# Patient Record
Sex: MALE | Race: BLACK OR AFRICAN AMERICAN | NOT HISPANIC OR LATINO | Employment: OTHER | URBAN - METROPOLITAN AREA
[De-identification: names, ages, dates, MRNs, and addresses within clinical notes are randomized per-mention and may not be internally consistent; named-entity substitution may affect disease eponyms.]

---

## 2017-05-03 ENCOUNTER — DOCUMENTATION ONLY (OUTPATIENT)
Dept: NEUROLOGY | Facility: HOSPITAL | Age: 77
End: 2017-05-03

## 2017-05-03 NOTE — PROGRESS NOTES
Video capsule study reviewed independently. There are a variety of minor abnormalities in the small bowel of little or no clinical significance. There is a polyp in the cecum vs proximal ascending colon which is 10mm in size. There is also a small amount of bright red blood in the ascending colon.   Recommend:   Repeat colonoscopy is the next step in this case to remove the observed polyp and re-evaluate for angioectasias or other bleeding sources in the right colon

## 2017-05-10 ENCOUNTER — TELEPHONE (OUTPATIENT)
Dept: NEUROLOGY | Facility: HOSPITAL | Age: 77
End: 2017-05-10

## 2017-05-10 NOTE — TELEPHONE ENCOUNTER
Patient refused to schedule at this tome because  he was told he would have an upper and lower endoscopy to remove polys in the colon and the stomach. Will send Dr Osullivan a message to clarify.

## 2017-05-10 NOTE — TELEPHONE ENCOUNTER
----- Message from Tammie Root sent at 5/9/2017 11:32 AM CDT -----  GI- Nurse Prater called and stated why this patient hasn't been scheduled yet. Please call Nurse Prater at [phone] [phone].

## 2017-05-10 NOTE — TELEPHONE ENCOUNTER
----- Message from Brayden Osullivan MD sent at 5/10/2017 12:15 PM CDT -----  Yes. Nulytely     ----- Message -----     From: Sofie Mckinley LPN     Sent: 5/10/2017  10:42 AM       To: Brayden Osullivan MD    Can I direct schedule this patient for a c-scope?  Thanks   ----- Message -----     From: Tammie Root     Sent: 5/9/2017   2:43 PM       To: Sofie Mckinley LPN    Patient needs to be scheduled for a Colonoscopy. Please call patient at [phone].

## 2017-05-10 NOTE — TELEPHONE ENCOUNTER
----- Message from Tammie Root sent at 5/9/2017  2:43 PM CDT -----  Patient needs to be scheduled for a Colonoscopy. Please call patient at [phone].

## 2017-05-11 ENCOUNTER — TELEPHONE (OUTPATIENT)
Dept: NEUROLOGY | Facility: HOSPITAL | Age: 77
End: 2017-05-11

## 2017-05-11 NOTE — TELEPHONE ENCOUNTER
----- Message from Candy Sarah sent at 5/11/2017  3:25 PM CDT -----  Contact: Patient  GI- Patient would like to speak to Sofie in regards to making an appointment with Dr. Osullivan. Patient can be reached at [phone].

## 2017-05-11 NOTE — TELEPHONE ENCOUNTER
Pt informed we need his records of his last EGD to decide. We don't usually remove polyps from the stomach unless they are bleeding per Dr Morse. Pt states to [facility] to get [facility]. Called place to Suly no answer no vm available. Will follow up tomorrow

## 2017-05-16 ENCOUNTER — TELEPHONE (OUTPATIENT)
Dept: NEUROLOGY | Facility: HOSPITAL | Age: 77
End: 2017-05-16

## 2017-05-16 DIAGNOSIS — K92.2 LOWER GI BLEED: Primary | ICD-10-CM

## 2017-05-16 NOTE — TELEPHONE ENCOUNTER
----- Message from Janel Avi sent at 5/16/2017 12:55 PM CDT -----  GI- Patient is returning Sofie's call. Please call back to assist at [phone].

## 2017-05-17 ENCOUNTER — DOCUMENTATION ONLY (OUTPATIENT)
Dept: NEUROLOGY | Facility: HOSPITAL | Age: 77
End: 2017-05-17

## 2017-05-17 NOTE — PROGRESS NOTES
[facility] reviewed. The most recent colonoscopy documented was from Jan 2017 which documents removal of two polyps in the ascending colon but one polyp was not removed due to active anti-platelet therapy. EGD that same day was revealing for a hiatal hernia only. Recent video capsule endoscopy demonstrates a significant polyp in the right colon associated with a small amount of bright red blood.     My recommendation is the same:     Repeat colonoscopy is the next step in this case to remove remaining polyps from the ascending and transverse colon as well as to re-evaluate for angioectasias or other bleeding sources in the right colon

## 2017-05-18 NOTE — TELEPHONE ENCOUNTER
Nulytely Colonoscopy Prep Instructions    [facility] - [address], [place] [address]  [phone]      PATIENT NAME:    Behzad Ruiz                 PROCEDURE DAY: Thursday June 8, 2018     *Your procedure time many change.  The hospital will contact you the day prior to   the procedure to confirm your procedure time and arrival.       CLEAR LIQUID DIET (START THE DAY BEFORE PROCEDURE): Wednesday       Clear Liquid Diet means any liquid from the list below that is not red or purple in color:   Gatorade, Dov-Aid, Lemonade (Yellow ONLY)-Gatorade is the preferred liquid   Tea (no milk or dairy)   Carbonated beverages (soft drink), regular or diet   Apple juice, white grape juice, white cranberry juice   Jell-O (orange, lemon, or lime flavors ONLY)   Clear, fat-free, beef or chicken broth   Bouillon, clear consommé   Snowball, Popsicles (NOT red or purple)  * No Solid Food or Alcohol     ITEMS TO BE PURCHASED FOR PREP (Nu-Lytely requires a prescription):         Nu-Lytely preparation solution.          Gas tablets (Gas-X, Mylanta Gas, Simethicone)    BOWEL PREP INSTRUCTIONS THE DAY BEFORE THE EXAM: Wednesday   1. Drink only clear liquids (see the above diet) all day. Gatorade is the best liquid.   Drink an extra 8 ounces of clear liquid every hour from 11am to 5pm.         2.   At 6pm, mix Nu-Lytely powder according to the directions on the container and               drink 8 ounces of solution every 10 minutes until about half of the solution is                consumed. Place the remainder of the solution in the refrigerator.         3.   At 9pm, take two gas tablets with 8 ounces of clear liquid.        4.   At 10pm, take two gas tablets with 8 ounces of clear liquid.      THE DAY OF THE EXAM: Thursday         1.  Beginning 5 hours before your procedure time, drink the remaining half of              Nu-Lytely solution. Drink 8 ounces of solution every 10 minutes until the  solution              is gone.              *If your procedure is scheduled for the early morning, you will need to get up in               the middle of the night to take this dose of preparation. The correct timing of this               dose is essential to an effective preparation. If you do not take this dose, your               exam may be incomplete and need to be repeated.         2.  Have nothing to eat or drink for 3 hours before the procedure.         3.  Take your morning medications, if any, with a small sip of water.         4.  Bring someone to drive you home (you should not drive for 12 hrs after the exam)        5.  Report to Admitting, 1st floor hospital entrance 2 hours before procedure time.       If you are diabetic, do not take insulin or oral medications the morning of the procedure. Take only a half dose of insulin the day before your procedure. Do not take your diabetic pills the day of your procedure               Colonoscopy is used to view the inside of your lower digestive tract (colon and rectum). It can help screen for colon cancer and can also help find the source of abdominal pain, bleeding, and changes in bowel habits. The test is usually done in the hospital on an outpatient basis. During the exam, the doctor can remove a small tissue sample ( a biopsy) for testing. Small growths, such as polyps, may also be removed during colonoscopy.     A camera attached to a flexible tube with a viewing lens is used to take video pictures.    Getting Ready    Be sure to tell your doctor about any medications you take. Also tell your doctor about any health conditions you may have.   Discuss the risks of the test with your doctor. These include bleeding and bowel puncture.   Your rectum and colon must be empty for the test. So be sure to follow the diet and bowel prep instructions exactly. If you dont, the test may need to be rescheduled.   You will need to have a friend or family member  prepared to drive you home after the test.     Colonoscopy provides an inside view of the entire colon.    During the Test    You are given sedating (relaxing) medication through an IV line. You may be drowsy or completely asleep.   The procedure takes 30 minutes or longer.   The doctor performs a digital rectal exam to check for anal and rectal problems. The rectum is lubricated and the scope inserted.   If you are awake, you may have a feeling similar to needing to have a bowel movement. You may also feel pressure as air is pumped into the colon. Its okay to pass gas during the procedure.  After the Test    You may discuss the results with your doctor right away or at a future visit.   Try to pass all the gas right after the test to help prevent bloating and cramping.   After the test, you can go back to your normal eating and other activities.  Risks and Possible Complications Include:   Bleeding  A puncture or tear in the colon  Risks of anesthesia

## 2017-05-18 NOTE — TELEPHONE ENCOUNTER
LVM to get correct PO BOX information. Instructions mailed to new PO BOX but did not add new PO BOX in demographics.

## 2017-05-18 NOTE — TELEPHONE ENCOUNTER
Pt scheduled for C-scope 6/8. Instructions given and mailed. Pt states he has Nulytely prep already.

## 2017-05-18 NOTE — TELEPHONE ENCOUNTER
Pt states he has VCE report that states he has a polyp in the stomach and requesting to speak with Dr Osullivan about the findings.

## 2017-05-22 ENCOUNTER — TELEPHONE (OUTPATIENT)
Dept: NEUROLOGY | Facility: HOSPITAL | Age: 77
End: 2017-05-22

## 2017-05-22 NOTE — TELEPHONE ENCOUNTER
Patient requesting to have c-scope done as inpatient due to not having any family or friends to drive him home after. Message sent to Dr Osullivan.

## 2017-05-22 NOTE — TELEPHONE ENCOUNTER
----- Message from Candy Sarah sent at 5/22/2017 10:22 AM CDT -----  Contact: Patient  GI- Patient could like to speak to the nurse in regards to instructions for his colonoscopy. Patient can be reached at [phone].

## 2017-05-23 NOTE — TELEPHONE ENCOUNTER
Patient notified colonoscopy can not be scheduled as inpatient per Dr Osullivan and will need to have someone there at time of discharge.

## 2017-05-24 ENCOUNTER — TELEPHONE (OUTPATIENT)
Dept: NEUROLOGY | Facility: HOSPITAL | Age: 77
End: 2017-05-24

## 2017-05-24 NOTE — TELEPHONE ENCOUNTER
Management plan discussed with patient. He does not need another EGD at this time (last exam Jan 2017) but does need a repeat colonoscopy to remove the remaining polyp in the colon not removed in January 2017 and to evaluate for bleeding sources in the right colon as he has small amounts of blood in his right colon on capsule endoscopy.

## 2017-06-05 ENCOUNTER — TELEPHONE (OUTPATIENT)
Dept: NEUROLOGY | Facility: HOSPITAL | Age: 77
End: 2017-06-05

## 2017-06-05 NOTE — TELEPHONE ENCOUNTER
----- Message from Ana Feliz sent at 6/5/2017  9:03 AM CDT -----  Contact: Patient  GI- Patient has questions about his medications  when to get off his plavix, isosorarode nitrate, and Patient can be reached at [phone].

## 2017-06-06 ENCOUNTER — TELEPHONE (OUTPATIENT)
Dept: NEUROLOGY | Facility: HOSPITAL | Age: 77
End: 2017-06-06

## 2017-06-06 NOTE — TELEPHONE ENCOUNTER
----- Message from Ana Feliz sent at 6/6/2017  9:55 AM CDT -----  Contact: Patient  GI- Patient has the movi prep for his procedure , he is wondering if he can use that instead of go lightly. Patients contact number is [phone]

## 2017-06-07 ENCOUNTER — TELEPHONE (OUTPATIENT)
Dept: NEUROLOGY | Facility: HOSPITAL | Age: 77
End: 2017-06-07

## 2017-06-07 NOTE — TELEPHONE ENCOUNTER
Pt given movie prep instructions again, advised not to eat any solid foods until after the procedure. Will call endoscopy to get time arrival for pt.

## 2017-06-07 NOTE — TELEPHONE ENCOUNTER
----- Message from Ana Feliz sent at 6/7/2017  8:59 AM CDT -----  Contact: Patient   GI- Patient needs directions on eating, Patient is very confused as to when he starts his prep. Patients call back number [phone]

## 2017-06-08 ENCOUNTER — HOSPITAL ENCOUNTER (OUTPATIENT)
Facility: HOSPITAL | Age: 77
Discharge: HOME | End: 2017-06-08
Attending: INTERNAL MEDICINE | Admitting: INTERNAL MEDICINE
Payer: COMMERCIAL

## 2017-06-08 ENCOUNTER — ANESTHESIA (OUTPATIENT)
Dept: ENDOSCOPY | Facility: HOSPITAL | Age: 77
End: 2017-06-08
Payer: COMMERCIAL

## 2017-06-08 ENCOUNTER — ANESTHESIA EVENT (OUTPATIENT)
Dept: ENDOSCOPY | Facility: HOSPITAL | Age: 77
End: 2017-06-08
Payer: COMMERCIAL

## 2017-06-08 VITALS
WEIGHT: 195 LBS | RESPIRATION RATE: 14 BRPM | OXYGEN SATURATION: 97 % | DIASTOLIC BLOOD PRESSURE: 78 MMHG | SYSTOLIC BLOOD PRESSURE: 134 MMHG | HEIGHT: 71 IN | TEMPERATURE: 97 F | BODY MASS INDEX: 27.3 KG/M2 | HEART RATE: 54 BPM

## 2017-06-08 DIAGNOSIS — K63.5 POLYP OF COLON, UNSPECIFIED PART OF COLON, UNSPECIFIED TYPE: Primary | ICD-10-CM

## 2017-06-08 DIAGNOSIS — K63.5 COLON POLYPS: ICD-10-CM

## 2017-06-08 DIAGNOSIS — K63.5 POLYP OF TRANSVERSE COLON, UNSPECIFIED TYPE: ICD-10-CM

## 2017-06-08 LAB — POCT GLUCOSE: 133 MG/DL (ref 70–110)

## 2017-06-08 PROCEDURE — 45390 COLONOSCOPY W/RESECTION: CPT | Performed by: INTERNAL MEDICINE

## 2017-06-08 PROCEDURE — 93005 ELECTROCARDIOGRAM TRACING: CPT

## 2017-06-08 PROCEDURE — 25000003 PHARM REV CODE 250: Performed by: INTERNAL MEDICINE

## 2017-06-08 PROCEDURE — 45381 COLONOSCOPY SUBMUCOUS NJX: CPT | Performed by: INTERNAL MEDICINE

## 2017-06-08 PROCEDURE — 27200997: Performed by: INTERNAL MEDICINE

## 2017-06-08 PROCEDURE — 63600175 PHARM REV CODE 636 W HCPCS: Performed by: NURSE ANESTHETIST, CERTIFIED REGISTERED

## 2017-06-08 PROCEDURE — 27201089 HC SNARE, DISP (ANY): Performed by: INTERNAL MEDICINE

## 2017-06-08 PROCEDURE — 45385 COLONOSCOPY W/LESION REMOVAL: CPT | Performed by: INTERNAL MEDICINE

## 2017-06-08 PROCEDURE — 88305 TISSUE EXAM BY PATHOLOGIST: CPT | Performed by: PATHOLOGY

## 2017-06-08 PROCEDURE — 37000009 HC ANESTHESIA EA ADD 15 MINS: Performed by: INTERNAL MEDICINE

## 2017-06-08 PROCEDURE — 27201028 HC NEEDLE, SCLERO: Performed by: INTERNAL MEDICINE

## 2017-06-08 PROCEDURE — 88305 TISSUE EXAM BY PATHOLOGIST: CPT | Mod: 26,,, | Performed by: PATHOLOGY

## 2017-06-08 PROCEDURE — 37000008 HC ANESTHESIA 1ST 15 MINUTES: Performed by: INTERNAL MEDICINE

## 2017-06-08 PROCEDURE — 93010 ELECTROCARDIOGRAM REPORT: CPT | Mod: ,,, | Performed by: INTERNAL MEDICINE

## 2017-06-08 PROCEDURE — 25000003 PHARM REV CODE 250: Performed by: NURSE ANESTHETIST, CERTIFIED REGISTERED

## 2017-06-08 RX ORDER — CLOPIDOGREL BISULFATE 75 MG/1
75 TABLET ORAL DAILY
Start: 2017-06-08

## 2017-06-08 RX ORDER — CLOPIDOGREL BISULFATE 75 MG/1
75 TABLET ORAL DAILY
Status: ON HOLD | COMMUNITY
End: 2017-06-08

## 2017-06-08 RX ORDER — GLIPIZIDE 5 MG/1
5 TABLET, FILM COATED, EXTENDED RELEASE ORAL
COMMUNITY

## 2017-06-08 RX ORDER — ASPIRIN 81 MG/1
81 TABLET ORAL DAILY
COMMUNITY

## 2017-06-08 RX ORDER — LIDOCAINE HCL/PF 100 MG/5ML
SYRINGE (ML) INTRAVENOUS
Status: DISCONTINUED | OUTPATIENT
Start: 2017-06-08 | End: 2017-06-08

## 2017-06-08 RX ORDER — SPIRONOLACTONE 25 MG/1
25 TABLET ORAL DAILY
COMMUNITY

## 2017-06-08 RX ORDER — FUROSEMIDE 20 MG/1
20 TABLET ORAL DAILY
COMMUNITY

## 2017-06-08 RX ORDER — PROPOFOL 10 MG/ML
VIAL (ML) INTRAVENOUS CONTINUOUS PRN
Status: DISCONTINUED | OUTPATIENT
Start: 2017-06-08 | End: 2017-06-08

## 2017-06-08 RX ORDER — ISOSORBIDE MONONITRATE 120 MG/1
120 TABLET, EXTENDED RELEASE ORAL DAILY
COMMUNITY

## 2017-06-08 RX ORDER — PROPOFOL 10 MG/ML
VIAL (ML) INTRAVENOUS
Status: DISCONTINUED | OUTPATIENT
Start: 2017-06-08 | End: 2017-06-08

## 2017-06-08 RX ORDER — FINASTERIDE 5 MG/1
10 TABLET, FILM COATED ORAL DAILY
COMMUNITY

## 2017-06-08 RX ORDER — SODIUM CHLORIDE 9 MG/ML
INJECTION, SOLUTION INTRAVENOUS CONTINUOUS
Status: DISCONTINUED | OUTPATIENT
Start: 2017-06-08 | End: 2017-06-08 | Stop reason: HOSPADM

## 2017-06-08 RX ADMIN — LIDOCAINE HYDROCHLORIDE 20 MG: 20 INJECTION, SOLUTION INTRAVENOUS at 01:06

## 2017-06-08 RX ADMIN — PROPOFOL 50 MG: 10 INJECTION, EMULSION INTRAVENOUS at 01:06

## 2017-06-08 RX ADMIN — SODIUM CHLORIDE: 0.9 INJECTION, SOLUTION INTRAVENOUS at 12:06

## 2017-06-08 RX ADMIN — PROPOFOL 100 MCG/KG/MIN: 10 INJECTION, EMULSION INTRAVENOUS at 01:06

## 2017-06-08 NOTE — DISCHARGE INSTRUCTIONS
Discharge Summary/Instructions for after Colonoscopy with Biopsy/Polypectomy    Behzad Ruiz  6/8/2017  Brayden Osullivan MD    Restrictions on Activity:    - Do not drive car or operate machinery until the day after the procedure.  - The following day: return to full activity including work.  - For 3 days: No heavy lifting, straining or running.  - Diet: Eat and drink normally unless instructed otherwise.    Treatment for Common Side Effects:  - Mild abdominal pain and bloating or excessive gas: rest, eat lightly and use a heating pad.     Symptoms to watch for and report to your physician:  1. Severe abdominal pain.  2. Fever within 24 hours after a procedure.  3. A large amount of rectal bleeding. (A small amount of blood from the rectum is not serious, especially if hemorrhoids are present.)  4. Because air was put into your colon during the procedure, expelling large amount of air from your rectum is normal.  5. You may not have a bowel movement for 1-3 days because of the colonoscopy prep. This is normal.  6. Go directly to the emergency room if you notice any of the following:     Chills and/or fever over 101   Persistent vomiting   Severe abdominal pain, other than gas cramps   Severe chest pain   Black, tarry stools   Any bleeding - exceeding one tablespoon    If you have any questions or problems, please call your Physician:    Brayden Osullivan MD      Lab Results: Contact Physician's Office      If a complication or emergency situation arises and you are unable to reach your Physician - GO TO THE EMERGENCY ROOM.

## 2017-06-08 NOTE — TRANSFER OF CARE
"Anesthesia Transfer of Care Note    Patient: Behzad Ruiz    Procedure(s) Performed: Procedure(s) (LRB):  COLONOSCOPY (N/A)    Patient location: GI    Anesthesia Type: MAC    Transport from OR: Transported from OR on room air with adequate spontaneous ventilation    Post pain: adequate analgesia    Post assessment: no apparent anesthetic complications    Post vital signs: stable    Level of consciousness: awake, alert and oriented    Nausea/Vomiting: no nausea/vomiting    Complications: none    Transfer of care protocol was followed      Last vitals:   Visit Vitals  BP (!) 169/89   Pulse (!) 56   Temp 36.4 °C (97.6 °F)   Resp 20   Ht 5' 11" (1.803 m)   Wt 88.5 kg (195 lb)   SpO2 99%   BMI 27.20 kg/m²     "

## 2017-06-08 NOTE — ANESTHESIA POSTPROCEDURE EVALUATION
"Anesthesia Post Evaluation    Patient: Behzad Ruiz    Procedure(s) Performed: Procedure(s) (LRB):  COLONOSCOPY (N/A)    Final Anesthesia Type: MAC  Patient location during evaluation: GI PACU  Patient participation: Yes- Able to Participate  Level of consciousness: awake and alert and oriented  Post-procedure vital signs: reviewed and stable  Pain management: adequate  Airway patency: patent  PONV status at discharge: No PONV  Anesthetic complications: no      Cardiovascular status: blood pressure returned to baseline, hemodynamically stable and stable  Respiratory status: room air, unassisted and spontaneous ventilation  Hydration status: euvolemic  Follow-up not needed.        Visit Vitals  BP (!) 169/89   Pulse (!) 56   Temp 36.4 °C (97.6 °F)   Resp 20   Ht 5' 11" (1.803 m)   Wt 88.5 kg (195 lb)   SpO2 99%   BMI 27.20 kg/m²       Pain/Blanka Score: No Data Recorded      "

## 2017-06-08 NOTE — H&P
"[facility]    CC: Colon polyp    HPI 76 y.o. male with a colon polyp found on previous colonoscopy that was left undistrubed due to pt being on ASA and plavix. Here for colonosocpy for removal of polyp and for evaluation of capsule findings of blood in right colon.       Review of Systems  General ROS: negative for chills, fever or weight loss  Cardiovascular ROS: no chest pain or dyspnea on exertion  Gastrointestinal ROS: no abdominal pain, change in bowel habits, or black/ bloody stools+diarrhea    Physical Examination  BP (!) 169/89   Pulse (!) 56   Temp 97.6 °F (36.4 °C)   Resp 20   Ht 5' 11" (1.803 m)   Wt 88.5 kg (195 lb)   SpO2 99%   BMI 27.20 kg/m²   General appearance: alert, cooperative, no distress  HENT: Normocephalic, atraumatic, neck symmetrical, no nasal discharge   Lungs: clear to auscultation bilaterally, no dullness to percussion bilaterally  Heart: regular rate and rhythm without rub; no displacement of the PMI   Abdomen: soft, non-tender; bowel sounds normoactive; no organomegaly  Extremities: extremities symmetric; no clubbing, cyanosis, or edema  Neurologic: Alert and oriented X 3, normal strength, normal coordination and gait    Assessment:   Hx of colon polyp  Hx of abnormal capsule with blood in right colon     Plan:  Colonoscopy today     Brayden Osullivan MD   [address], [address]   [place] [address] [phone]      "

## 2017-06-08 NOTE — ANESTHESIA PREPROCEDURE EVALUATION
06/08/2017  Behzad Ruiz is a 76 y.o., male for colonoscopy under MAC. Pt has sleep apnea    Anesthesia Evaluation     I have reviewed the Nursing Notes.   I have reviewed the Medications.     Review of Systems  Anesthesia Hx:   Denies Personal Hx of Anesthesia complications.   Hematology/Oncology:         -- Anemia:   Cardiovascular:   Exercise tolerance: good CAD asymptomatic CABG/stent (multiple stents 2014 and 2015) Angina (stable) PVD Takes NTG for angina, currently no CP or SOB   Pulmonary:   Sleep Apnea    Hepatic/GI:   Bowel Prep.        Physical Exam  General:  Obesity       Chest/Lungs:  Chest/Lungs Clear    Heart/Vascular:  Heart Findings: Normal            Anesthesia Plan  Type of Anesthesia, risks & benefits discussed:  Anesthesia Type:  MAC  Patient's Preference:   Intra-op Monitoring Plan:   Intra-op Monitoring Plan Comments:   Post Op Pain Control Plan:   Post Op Pain Control Plan Comments:   Induction:    Beta Blocker:  Patient is not currently on a Beta-Blocker (No further documentation required).       Informed Consent: Patient understands risks and agrees with Anesthesia plan.  Questions answered. Anesthesia consent signed with patient.  ASA Score: 3     Day of Surgery Review of History & Physical:            Ready For Surgery From Anesthesia Perspective.

## 2017-06-13 ENCOUNTER — TELEPHONE (OUTPATIENT)
Dept: NEUROLOGY | Facility: HOSPITAL | Age: 77
End: 2017-06-13

## 2017-06-13 NOTE — TELEPHONE ENCOUNTER
----- Message from Ana Feliz sent at 6/13/2017  1:28 PM CDT -----  Contact: Patient  GI- Patient called requesting letter of arrival and date and release and date of procedure he had with Dr Osullivan. Patients fax number [phone] and patient would like a confirmation phone call stating that it has been sent. [phone].

## 2017-06-19 ENCOUNTER — TELEPHONE (OUTPATIENT)
Dept: NEUROLOGY | Facility: HOSPITAL | Age: 77
End: 2017-06-19

## 2017-06-19 NOTE — TELEPHONE ENCOUNTER
[facility]    I have discussed the results of the patient's recent colonoscopy with him.  The polyps that were removed were both tubular adenomas.  Plan to repeat colonoscopy in 3 years for surveillance.

## 2017-06-23 ENCOUNTER — TELEPHONE (OUTPATIENT)
Dept: NEUROLOGY | Facility: HOSPITAL | Age: 77
End: 2017-06-23

## 2017-06-23 NOTE — TELEPHONE ENCOUNTER
----- Message from Ana Feliz sent at 6/23/2017  2:33 PM CDT -----  Contact: Patient  GI- Patient needs a letter stating date of procedure. Patient can be reached at [phone]

## 2017-07-06 ENCOUNTER — TELEPHONE (OUTPATIENT)
Dept: NEUROLOGY | Facility: HOSPITAL | Age: 77
End: 2017-07-06

## 2017-07-06 NOTE — TELEPHONE ENCOUNTER
Call received from pt requesting date, arrival time and discharge time of procedure. Request to fax information to [employer] at [phone].  Information faxed as requested.  Pt notified.

## 2017-07-13 ENCOUNTER — TELEPHONE (OUTPATIENT)
Dept: NEUROLOGY | Facility: HOSPITAL | Age: 77
End: 2017-07-13

## 2017-07-13 NOTE — TELEPHONE ENCOUNTER
Pt request fax to [employer] be sent again.  Information faxed as requested.  Confimation received.

## 2017-07-13 NOTE — TELEPHONE ENCOUNTER
----- Message from Candy Sarah sent at 7/13/2017  2:11 PM CDT -----  Contact: Patient  GI- Patient would like the nurse to call him back and would not disclose what it is in regards to. Patient can be reached at [phone].

## 2017-09-20 ENCOUNTER — TELEPHONE (OUTPATIENT)
Dept: NEUROLOGY | Facility: HOSPITAL | Age: 77
End: 2017-09-20

## 2017-09-20 NOTE — TELEPHONE ENCOUNTER
----- Message from Janel Cárdenas sent at 9/20/2017  2:54 PM CDT -----  GI- Please call Bassam [phone] ex [phone] to inform him of the date of the appointment.

## 2017-09-21 ENCOUNTER — TELEPHONE (OUTPATIENT)
Dept: NEUROLOGY | Facility: HOSPITAL | Age: 77
End: 2017-09-21

## 2017-09-21 NOTE — TELEPHONE ENCOUNTER
Call received from pt, in attempt to schedule clinic appt.  Pt not aware an appt should be scheduled and [facility] before [facility].  Pt to call this clinic after talking with [facility] representative.

## 2017-09-21 NOTE — TELEPHONE ENCOUNTER
Clinic appt scheduled with pt on Wednesday, October 4, 2017 at 1030am.  Pt confirmed this date and time.

## 2017-10-04 ENCOUNTER — OFFICE VISIT (OUTPATIENT)
Dept: NEUROLOGY | Facility: HOSPITAL | Age: 77
End: 2017-10-04
Attending: INTERNAL MEDICINE
Payer: COMMERCIAL

## 2017-10-04 ENCOUNTER — LAB VISIT (OUTPATIENT)
Dept: LAB | Facility: HOSPITAL | Age: 77
End: 2017-10-04
Attending: INTERNAL MEDICINE
Payer: COMMERCIAL

## 2017-10-04 VITALS
WEIGHT: 205.5 LBS | SYSTOLIC BLOOD PRESSURE: 91 MMHG | TEMPERATURE: 97 F | BODY MASS INDEX: 28.77 KG/M2 | DIASTOLIC BLOOD PRESSURE: 50 MMHG | HEART RATE: 50 BPM | HEIGHT: 71 IN

## 2017-10-04 DIAGNOSIS — D50.0 ANEMIA DUE TO CHRONIC BLOOD LOSS: ICD-10-CM

## 2017-10-04 DIAGNOSIS — D50.0 ANEMIA DUE TO CHRONIC BLOOD LOSS: Primary | ICD-10-CM

## 2017-10-04 LAB
ALBUMIN SERPL BCP-MCNC: 3.4 G/DL
ALP SERPL-CCNC: 82 U/L
ALT SERPL W/O P-5'-P-CCNC: 23 U/L
ANION GAP SERPL CALC-SCNC: 7 MMOL/L
AST SERPL-CCNC: 22 U/L
BASOPHILS # BLD AUTO: 0.03 K/UL
BASOPHILS NFR BLD: 0.3 %
BILIRUB SERPL-MCNC: 0.5 MG/DL
BUN SERPL-MCNC: 47 MG/DL
CALCIUM SERPL-MCNC: 9.3 MG/DL
CHLORIDE SERPL-SCNC: 110 MMOL/L
CO2 SERPL-SCNC: 24 MMOL/L
CREAT SERPL-MCNC: 2.7 MG/DL
DIFFERENTIAL METHOD: ABNORMAL
EOSINOPHIL # BLD AUTO: 0.3 K/UL
EOSINOPHIL NFR BLD: 2.6 %
ERYTHROCYTE [DISTWIDTH] IN BLOOD BY AUTOMATED COUNT: 14.6 %
EST. GFR  (AFRICAN AMERICAN): 25 ML/MIN/1.73 M^2
EST. GFR  (NON AFRICAN AMERICAN): 22 ML/MIN/1.73 M^2
FERRITIN SERPL-MCNC: 65 NG/ML
GLUCOSE SERPL-MCNC: 97 MG/DL
HCT VFR BLD AUTO: 35.7 %
HGB BLD-MCNC: 11.2 G/DL
IRON SERPL-MCNC: 95 UG/DL
LYMPHOCYTES # BLD AUTO: 2.3 K/UL
LYMPHOCYTES NFR BLD: 22.8 %
MCH RBC QN AUTO: 28.5 PG
MCHC RBC AUTO-ENTMCNC: 31.4 G/DL
MCV RBC AUTO: 91 FL
MONOCYTES # BLD AUTO: 0.8 K/UL
MONOCYTES NFR BLD: 7.9 %
NEUTROPHILS # BLD AUTO: 6.6 K/UL
NEUTROPHILS NFR BLD: 66.4 %
PLATELET # BLD AUTO: 181 K/UL
PMV BLD AUTO: 12.1 FL
POTASSIUM SERPL-SCNC: 4.2 MMOL/L
PROT SERPL-MCNC: 7.5 G/DL
RBC # BLD AUTO: 3.93 M/UL
SATURATED IRON: 30 %
SODIUM SERPL-SCNC: 141 MMOL/L
TOTAL IRON BINDING CAPACITY: 321 UG/DL
TRANSFERRIN SERPL-MCNC: 217 MG/DL
WBC # BLD AUTO: 9.96 K/UL

## 2017-10-04 PROCEDURE — 85025 COMPLETE CBC W/AUTO DIFF WBC: CPT

## 2017-10-04 PROCEDURE — 36415 COLL VENOUS BLD VENIPUNCTURE: CPT

## 2017-10-04 PROCEDURE — 82728 ASSAY OF FERRITIN: CPT

## 2017-10-04 PROCEDURE — 83540 ASSAY OF IRON: CPT

## 2017-10-04 PROCEDURE — 80053 COMPREHEN METABOLIC PANEL: CPT

## 2017-10-04 PROCEDURE — 99214 OFFICE O/P EST MOD 30 MIN: CPT | Performed by: INTERNAL MEDICINE

## 2017-10-04 RX ORDER — LORATADINE 10 MG/1
10 TABLET ORAL DAILY
COMMUNITY

## 2017-10-04 RX ORDER — FERROUS GLUCONATE 324(38)MG
324 TABLET ORAL 2 TIMES DAILY
COMMUNITY

## 2017-10-04 RX ORDER — TAMSULOSIN HYDROCHLORIDE 0.4 MG/1
0.4 CAPSULE ORAL DAILY
COMMUNITY

## 2017-10-04 RX ORDER — LOSARTAN POTASSIUM 100 MG/1
100 TABLET ORAL DAILY
COMMUNITY

## 2017-10-04 RX ORDER — PANTOPRAZOLE SODIUM 20 MG/1
40 TABLET, DELAYED RELEASE ORAL DAILY
COMMUNITY

## 2017-10-04 NOTE — PROGRESS NOTES
"[facility]    CC: anemia    HPI 77 y.o. male with history of moderate, persistent, multifactorial anemia here for follow up.  He was previously seen over the summer for removal of a transverse colon polyp as well as for review of VCE.  Polyps were successfully resected and review of capsule demonstrated so significant findings but red blood was found in the right colon.  Since then, he denies any change in symptoms including overt GI bleeding, requirement for transfusion, or any other new GI issues.     Past records reviewed.     Past Medical History:  Anemia  CHF  CKD  CAD  Hep C    Review of Systems  General ROS: negative for chills, fever or weight loss  Cardiovascular ROS: no chest pain or dyspnea on exertion  Gastrointestinal ROS: no abdominal pain, change in bowel habits, or black/ bloody stools    Physical Examination  BP (!) 91/50   Pulse (!) 50   Temp 96.7 °F (35.9 °C) (Oral)   Ht 5' 11" (1.803 m)   Wt 93.2 kg (205 lb 7.5 oz)   BMI 28.66 kg/m²   General appearance: alert, cooperative, no distress  HENT: Normocephalic, atraumatic, neck symmetrical, no nasal discharge   Lungs: clear to auscultation bilaterally, no dullness to percussion bilaterally  Heart: regular rate and rhythm without rub; no displacement of the PMI   Abdomen: soft, non-tender; bowel sounds normoactive; no organomegaly  Extremities: extremities symmetric; no clubbing, cyanosis, or edema  Neurologic: Alert and oriented X 3, normal strength, normal coordination and gait    Labs:  H/H: 10/33 (6/17)    Imaging:  None to review    Assessment:   77 year old male with persistent anemia on oral iron. I reviewed this [facility] and found bright red blood in the right colon but no real pathology in the small bowel. I removed a 15mm polyp from the right colon recently but did not find any other bleeding sources in the right colon. If his anemia is persistent and severe with suspected chronic GI " blood loss, an upper balloon enteroscopy might be considered as an additional with no notable overt GI bleeding.  We will recheck labs today and if his DEMETRIA persists, will plan for upper SBE to rule out any small bowel source including angioectasias for his persistent anemia.  He will continue his iron supplementation three times a day for now but may be able to decrease to once per day. He does not require PRBC transfusions.     Plan:  1. CBC, Iron studies today  2. Upper SBE as next step if DEMETRIA persists  3. [facility]     Brayden Osullivan MD   [address] [place] [address], [address]   [place] [address] [phone]

## 2017-10-04 NOTE — LETTER
October 4, 2017    Behzad Ruiz  [address]  [facility] [address]     To whom it concerns,               Mr Behzad Ruiz was seen by Dr. Brayden Osullivan on today with a 1030am appointment time.  Mr Ruiz arrived at the clinic at 1010am and departed at 1045.      If additional information is required, please don't hesitate to contact this office.    Karley Jamil LPN